# Patient Record
Sex: MALE | Race: OTHER | URBAN - METROPOLITAN AREA
[De-identification: names, ages, dates, MRNs, and addresses within clinical notes are randomized per-mention and may not be internally consistent; named-entity substitution may affect disease eponyms.]

---

## 2018-02-12 NOTE — PATIENT DISCUSSION
(B04.588) Dermatochalasis of left upper eyelid - Assesment : Examination revealed Dermatochalasis - Plan : Monitor for changes. Advised patient to call our office with decreased vision or increased symptoms.

## 2018-02-12 NOTE — PATIENT DISCUSSION
(H25.13) Age-related nuclear cataract, bilateral - Assesment : Examination revealed cataract. SIGNIFICANT CATARACTS IN BOTH EYES TODAY.   RECOMMEND CONSIDERING CATARACT SURGERY DUE TO THE SIGNIFICANCE OF THE CATARACTS IN BOTH EYES.  DO NOT RECOMMEND LASIK SURGERY.  **POSSIBLY AMBLYOPIA OS - Plan : SX COUNSELING - OS FIRST

## 2018-02-12 NOTE — PATIENT DISCUSSION
(D13.117) Vitreous degeneration, bilateral - Assesment : Examination revealed PVD - Plan : Monitor for changes. Advised patient to call our office with decreased vision or an increase in flashes and/or floaters.

## 2018-02-12 NOTE — PATIENT DISCUSSION
(X00.073) Dermatochalasis of right upper eyelid - Assesment : Examination revealed Dermatochalasis - Plan : Monitor for changes. Advised patient to call our office with decreased vision or increased symptoms.

## 2019-09-10 NOTE — PATIENT DISCUSSION
Examination revealed PVD OU. Plan:  Monitor for changes. Advised patient to call our office with decreased vision or an increase in flashes and/or floaters.

## 2019-09-10 NOTE — PATIENT DISCUSSION
Examination revealed cataract. Patient is symptomatic with visual function affected significantly. Plan:  Patient would like to consider cataract surgery in November. Discussed MF IOL and risks of halos or glare that can be associated with MF lenses. Monitor for changes. Advised patient to call our office with decreased vision or an increase in symptoms. ALBERT Keane/NUBIA's.

## 2019-09-10 NOTE — PATIENT DISCUSSION
(B25.025) Dermatochalasis of right upper eyelid - Assesment : Examination revealed Dermatochalasis - Plan : Monitor for changes. Advised patient to call our office with decreased vision or increased symptoms.

## 2019-09-10 NOTE — PATIENT DISCUSSION
(F34.089) Vitreous degeneration, bilateral - Assesment : Examination revealed PVD OU. - Plan : Monitor for changes. Advised patient to call our office with decreased vision or an increase in flashes and/or floaters.

## 2019-09-10 NOTE — PATIENT DISCUSSION
(O66.733) Dermatochalasis of left upper eyelid - Assesment : Examination revealed Dermatochalasis - Plan : Monitor for changes. Advised patient to call our office with decreased vision or increased symptoms.

## 2019-09-10 NOTE — PATIENT DISCUSSION
(H25.13) Age-related nuclear cataract, bilateral - Assesment : Examination revealed cataract. Patient is symptomatic with visual function affected significantly. - Plan : Patient would like to consider cataract surgery in November. Discussed MF IOL and risks of halos or glare that can be  associated with MF lenses. Monitor for changes. Advised patient to call our office with decreased vision or an increase in symptoms. ALBERT Keane/RBASA's.

## 2019-11-07 NOTE — PATIENT DISCUSSION
Eye OS, Iol Type Monofocal lens, Post Operative Target -0.50, DR recommendation TP w/ LRI, Package TP w/ LRI.

## 2019-11-07 NOTE — PATIENT DISCUSSION
The risks, benefits and alternatives of cataract surgery were discussed with the patient. Risks including but not limited to: Infection, retinal detachment, lens dislocation, inflammation, loss of vision, increased pressure and need for further surgery. We discussed all the lens options including monofocal lens, toric lens, multifocal lens, astigmatism correction and other options. The patient understands that they may need glasses for optimal vision with any option. Advised that patient has different axial length OU that contributes to anisometropia.

## 2019-11-26 NOTE — PATIENT DISCUSSION
Eye OD, Iol Type Monofocal lens, Post Operative Target PL/-0.25, DR recommendation TP w/ possible LRI, Package TP w/ possible LRI.

## 2021-12-07 NOTE — PATIENT DISCUSSION
Eye OD, Iol Type Monofocal lens, Post Operative Target PL/-0.25, DR recommendation TP w/ possible LRI, Package TP w/ possible LRI. 143

## 2022-02-18 ENCOUNTER — NEW PATIENT (OUTPATIENT)
Dept: URBAN - METROPOLITAN AREA CLINIC 53 | Facility: CLINIC | Age: 62
End: 2022-02-18

## 2022-02-18 DIAGNOSIS — E11.3312: ICD-10-CM

## 2022-02-18 DIAGNOSIS — H35.3191: ICD-10-CM

## 2022-02-18 DIAGNOSIS — H16.223: ICD-10-CM

## 2022-02-18 DIAGNOSIS — H25.813: ICD-10-CM

## 2022-02-18 DIAGNOSIS — E11.3291: ICD-10-CM

## 2022-02-18 PROCEDURE — 92134 CPTRZ OPH DX IMG PST SGM RTA: CPT

## 2022-02-18 PROCEDURE — 92004 COMPRE OPH EXAM NEW PT 1/>: CPT

## 2022-02-18 ASSESSMENT — VISUAL ACUITY
OD_PH: 20/30
OS_PH: 20/30
OD_GLARE: 20/60
OS_GLARE: 20/70
OU_SC: 20/30-1
OD_SC: 20/40-2
OS_SC: 20/40-1

## 2022-02-18 ASSESSMENT — TONOMETRY
OD_IOP_MMHG: 15
OS_IOP_MMHG: 15

## 2022-02-18 NOTE — PATIENT DISCUSSION
Refer to [facility] for possible laser OS. Once patient is released from [facility] we can proceed with CE IOL.

## 2023-11-01 NOTE — PATIENT DISCUSSION
Please cancel the attached refill request auto generated by the pharmacy. Pt has been dc from the practice. Pharmacy has been made aware. Recommend artificial tears 4 times daily in both eyes for best vision and comfort. Brands such as Refresh, Thera Tears, and Systane are good options. Avoid redness away drops such as Visine.